# Patient Record
Sex: FEMALE | Race: WHITE | HISPANIC OR LATINO | Employment: UNEMPLOYED | URBAN - METROPOLITAN AREA
[De-identification: names, ages, dates, MRNs, and addresses within clinical notes are randomized per-mention and may not be internally consistent; named-entity substitution may affect disease eponyms.]

---

## 2021-07-10 ENCOUNTER — HOSPITAL ENCOUNTER (EMERGENCY)
Facility: MEDICAL CENTER | Age: 26
End: 2021-07-10
Attending: OBSTETRICS & GYNECOLOGY | Admitting: OBSTETRICS & GYNECOLOGY
Payer: COMMERCIAL

## 2021-07-10 ENCOUNTER — APPOINTMENT (OUTPATIENT)
Dept: RADIOLOGY | Facility: MEDICAL CENTER | Age: 26
End: 2021-07-10
Attending: OBSTETRICS & GYNECOLOGY
Payer: COMMERCIAL

## 2021-07-10 VITALS
OXYGEN SATURATION: 95 % | TEMPERATURE: 98.3 F | WEIGHT: 130 LBS | HEART RATE: 101 BPM | SYSTOLIC BLOOD PRESSURE: 114 MMHG | BODY MASS INDEX: 20.4 KG/M2 | DIASTOLIC BLOOD PRESSURE: 69 MMHG | HEIGHT: 67 IN

## 2021-07-10 LAB — A1 MICROGLOB PLACENTAL VAG QL: NEGATIVE

## 2021-07-10 PROCEDURE — 84112 EVAL AMNIOTIC FLUID PROTEIN: CPT

## 2021-07-10 PROCEDURE — 99283 EMERGENCY DEPT VISIT LOW MDM: CPT | Mod: 25 | Performed by: OBSTETRICS & GYNECOLOGY

## 2021-07-10 PROCEDURE — 59025 FETAL NON-STRESS TEST: CPT | Mod: 26 | Performed by: OBSTETRICS & GYNECOLOGY

## 2021-07-10 PROCEDURE — 99284 EMERGENCY DEPT VISIT MOD MDM: CPT

## 2021-07-10 PROCEDURE — 76816 OB US FOLLOW-UP PER FETUS: CPT

## 2021-07-10 PROCEDURE — 59025 FETAL NON-STRESS TEST: CPT

## 2021-07-11 NOTE — PROGRESS NOTES
1900: report received from jackson huerta, assumed care of patient.      2000: rn review lab and ultrasound results.  Call to kamala bermudez cnm, report given.    2010: dr. tafoya and rn at [facility] by md closed.  Orders to discharge home with labor precautions.  Pt discharged in stable condition.

## 2022-07-07 NOTE — PROGRESS NOTES
EDC- , EGA- 28.3    0957- Pt arrived to L&D with c/o having and episode of LOF at 1700 today followed by irregular UC's.  Pt denies further LOF or need to wear a erlinda pad.  Pt gets care in [place], is here on vacation.  EFM/JOSEO applied, VSS.  Report to Dr. Rodríguez.   J-Code:

## 2022-12-13 NOTE — ED PROVIDER NOTES
"OB H&P:    CC: loss of fluid.    HPI:  Ms. Gavin Alejandre is a 25 y.o.  @ 28w3d by pt reported MERT with loss of fluid and contractions.  Reports a gush of clear fluid that soaked through her clothes at 1700, no odor, has not continued to leak since that time.  Reports irregular ctx since then as well as yesterday. +FM, no VB.    PNC in [place], denies complications.  Pt is in town for a [employer] tournament.          ROS:  Const: denies fevers, general concerns  CV/resp: reports no concerns  GI: denies abd pain, GI concerns  : see HPI  Neuro: reports no HA/vision changes    OBHx:    x2 37wks, denies complications other than admission for ctx in G1 pregnancy @27wks    GYN: abnl pap requiring biopsy only few years ago, denies STIs.    PMHx: denies  PSHx: denies    Meds: none    Famhx: HTN, denies other problems        PE:  Vitals:    07/10/21 1800 07/10/21 1801   BP: 114/69    Pulse: (!) 107 (!) 101   Temp:  36.8 °C (98.3 °F)   TempSrc:  Temporal   SpO2:  95%   Weight:  59 kg (130 lb)   Height:  1.702 m (5' 7\")     gen: AAO, NAD  abd: soft, gravid, NT  : NEFG, neg pooling, valsalva; cervix visually closed  Ext: No edema      SVE: cl/th/h  FHT: 135/mod variability/+ accels/ no decels  Marta: no ctx    Umbilical Artery S/D Ratio(s):  Not applicable     Fetal Biometry  BPD    7.81 cm, 31 weeks, 3 days, ( )  HC    28.21 cm, 31 weeks, 0 days, (88 Percent)  AC    24.12 cm, 28 weeks, 3 days, (42 Percent)  Femur Length    6.03 cm, 31 weeks, 3 days, (97 Percent)  Humerus Length    4.98 cm, 29 weeks, 2 days, (62 Percent)     EGA by this US:  30 weeks, 4 days  MERT by this US: 2021  MERT by [facility] US: Not applicable     Estimated Fetal Weight:  1464 grams  EFW Percentile: 87 Percent     Comments:     IMPRESSION:     1.  Single intrauterine pregnancy of an estimated gestational age of 30 weeks, 4 days with an estimated date of delivery of 2021.     2.  Normal amniotic fluid index and appearance of the " no placenta      A/P: 25 y.o.  @ 28w3d by pt reported MERT (30w4d by US today) c/w loss of fluid  Exam without signs of ROM, Amnisure neg and normal YAJAIRA.  Low suspicion for ROM, discussed precautions to return if additional LOF/labor concerns.  NST reactive and reassuring as per my read    Has f/u with primary OB in 1 week, instructed to keep appt.        Kandi Walden MD  [facility], Women's Health